# Patient Record
Sex: MALE | Race: OTHER
[De-identification: names, ages, dates, MRNs, and addresses within clinical notes are randomized per-mention and may not be internally consistent; named-entity substitution may affect disease eponyms.]

---

## 2019-12-05 ENCOUNTER — HOSPITAL ENCOUNTER (EMERGENCY)
Dept: HOSPITAL 72 - EMR | Age: 39
Discharge: HOME | End: 2019-12-05
Payer: MEDICAID

## 2019-12-05 VITALS — HEIGHT: 61 IN | BODY MASS INDEX: 37.76 KG/M2 | WEIGHT: 200 LBS

## 2019-12-05 VITALS — SYSTOLIC BLOOD PRESSURE: 156 MMHG | DIASTOLIC BLOOD PRESSURE: 98 MMHG

## 2019-12-05 VITALS — SYSTOLIC BLOOD PRESSURE: 168 MMHG | DIASTOLIC BLOOD PRESSURE: 68 MMHG

## 2019-12-05 DIAGNOSIS — B02.9: Primary | ICD-10-CM

## 2019-12-05 DIAGNOSIS — I10: ICD-10-CM

## 2019-12-05 DIAGNOSIS — Z88.0: ICD-10-CM

## 2019-12-05 PROCEDURE — 99282 EMERGENCY DEPT VISIT SF MDM: CPT

## 2019-12-05 NOTE — NUR
ED Nurse Note:





pt walked in c/o rash throughout his body with pain 9/10.

noted small clustered blistering rash and redness throughout pt's upper trunk 
area. tenderness noted. will cont monitor.

## 2019-12-05 NOTE — NUR
ED Nurse Note:





pt cleared to be d/c per ermd, pt discharge and aftercare instruction provided 
w/ prescription, pt education done via discussion and handout, pt advised to 
follow up with pcp, pt verbalized understanding, pt vss, ambulatory w/ steady 
gait, left w/ all belongings.

## 2019-12-05 NOTE — EMERGENCY ROOM REPORT
History of Present Illness


General


Chief Complaint:  Skin Rash/Abscess


Source:  Patient





Present Illness


HPI


Patientis a 39-year-old male presents after increased painful skin rash to the 

left side of his chest.  Patient had gradual onset of symptoms.  He reports 

taking medications for pain only.  He had previous history of psoriasis.  He is 

using only topical medications for this.  He reports having increased pain to 

the area to the left side of his chest.  He denies any fever.


Allergies:  


Uncoded Allergies:  


     PENICILLIN (Allergy, Unknown, 12/5/19)





Patient History


Past Medical History:  see triage record


Reviewed Nursing Documentation:  PMH: Agreed; PSxH: Agreed





Nursing Documentation-PMH


Hx Hypertension:  Yes





Review of Systems


All Other Systems:  negative except mentioned in HPI





Physical Exam





Vital Signs








  Date Time  Temp Pulse Resp B/P (MAP) Pulse Ox O2 Delivery O2 Flow Rate FiO2


 


12/5/19 00:40 98.4 85 18 183/111 (135) 97 Room Air  








General Appearance:  well appearing, no apparent distress, alert, GCS 15, obese


Head:  normocephalic, atraumatic


ENT:  hearing grossly normal, normal voice


Neck:  full range of motion, supple


Respiratory:  lungs clear, no respiratory distress, speaking full sentences


Gastrointestinal:  normal inspection


Musculoskeletal:  no calf tenderness


Neurologic:  alert, motor strength/tone normal, CNs III-XII nml as tested, 

normal gait


Psychiatric:  mood/affect normal


Skin:  rash - rash to left side of trunk





Medical Decision Making


Diagnostic Impression:  


 Primary Impression:  


 Shingles


ER Course


Present for skin rash.  Differential diagnosis includes but is not limited to 

allergic reaction, contact dermatitis, shingles among others.  Patient's rash 

is consistent with a shingles outbreak.  Patient will be given prescription for 

acyclovir.  He will also be given prescription for pain medications.  He was 

advised to follow-up with his primary care physician or dermatologist for 

recheck in the next few days.  He is advised to return if worse.





Last Vital Signs








  Date Time  Temp Pulse Resp B/P (MAP) Pulse Ox O2 Delivery O2 Flow Rate FiO2


 


12/5/19 00:40 98.4 85 18 183/111 (135) 97 Room Air  








Status:  improved


Disposition:  HOME, SELF-CARE


Condition:  Stable


Scripts


Hydrocodone Bit/Acetaminophen 5-325* (NORCO 5-325*) 1 Each Tablet


1 TAB ORAL Q6H PRN for For Pain, #20 TAB 0 Refills


   Prov: Pratik Robins MD         12/5/19 


Acyclovir* (ZOVIRAX*) 800 Mg Tablet


800 MG ORAL FIVE TIMES A DAY, #35 TAB


   Prov: Pratik Robins MD         12/5/19











Pratik Robins MD Dec 5, 2019 01:22

## 2020-02-26 ENCOUNTER — HOSPITAL ENCOUNTER (INPATIENT)
Dept: HOSPITAL 72 - EMR | Age: 40
LOS: 1 days | Discharge: HOME | DRG: 872 | End: 2020-02-27
Payer: SELF-PAY

## 2020-02-26 VITALS — SYSTOLIC BLOOD PRESSURE: 137 MMHG | DIASTOLIC BLOOD PRESSURE: 76 MMHG

## 2020-02-26 VITALS — DIASTOLIC BLOOD PRESSURE: 97 MMHG | SYSTOLIC BLOOD PRESSURE: 154 MMHG

## 2020-02-26 VITALS — SYSTOLIC BLOOD PRESSURE: 157 MMHG | DIASTOLIC BLOOD PRESSURE: 91 MMHG

## 2020-02-26 VITALS — SYSTOLIC BLOOD PRESSURE: 135 MMHG | DIASTOLIC BLOOD PRESSURE: 83 MMHG

## 2020-02-26 VITALS — DIASTOLIC BLOOD PRESSURE: 93 MMHG | SYSTOLIC BLOOD PRESSURE: 142 MMHG

## 2020-02-26 VITALS — DIASTOLIC BLOOD PRESSURE: 96 MMHG | SYSTOLIC BLOOD PRESSURE: 153 MMHG

## 2020-02-26 VITALS — WEIGHT: 233.12 LBS | BODY MASS INDEX: 39.8 KG/M2 | HEIGHT: 64 IN

## 2020-02-26 DIAGNOSIS — E66.01: ICD-10-CM

## 2020-02-26 DIAGNOSIS — Z88.0: ICD-10-CM

## 2020-02-26 DIAGNOSIS — E86.0: ICD-10-CM

## 2020-02-26 DIAGNOSIS — L40.9: ICD-10-CM

## 2020-02-26 DIAGNOSIS — N17.9: ICD-10-CM

## 2020-02-26 DIAGNOSIS — I10: ICD-10-CM

## 2020-02-26 DIAGNOSIS — A41.9: Primary | ICD-10-CM

## 2020-02-26 DIAGNOSIS — J20.9: ICD-10-CM

## 2020-02-26 LAB
ADD MANUAL DIFF: NO
ALBUMIN SERPL-MCNC: 3.4 G/DL (ref 3.4–5)
ALBUMIN/GLOB SERPL: 1 {RATIO} (ref 1–2.7)
ALP SERPL-CCNC: 115 U/L (ref 46–116)
ALT SERPL-CCNC: 32 U/L (ref 12–78)
ANION GAP SERPL CALC-SCNC: 11 MMOL/L (ref 5–15)
APPEARANCE UR: CLEAR
APTT PPP: (no result) S
AST SERPL-CCNC: 18 U/L (ref 15–37)
BILIRUB SERPL-MCNC: 0.3 MG/DL (ref 0.2–1)
BUN SERPL-MCNC: 17 MG/DL (ref 7–18)
CALCIUM SERPL-MCNC: 9 MG/DL (ref 8.5–10.1)
CHLORIDE SERPL-SCNC: 107 MMOL/L (ref 98–107)
CO2 SERPL-SCNC: 26 MMOL/L (ref 21–32)
CREAT SERPL-MCNC: 1.6 MG/DL (ref 0.55–1.3)
ERYTHROCYTE [DISTWIDTH] IN BLOOD BY AUTOMATED COUNT: 12.1 % (ref 11.6–14.8)
GLOBULIN SER-MCNC: 3.5 G/DL
GLUCOSE UR STRIP-MCNC: NEGATIVE MG/DL
HCT VFR BLD CALC: 48.5 % (ref 42–52)
HGB BLD-MCNC: 16.5 G/DL (ref 14.2–18)
KETONES UR QL STRIP: NEGATIVE
LEUKOCYTE ESTERASE UR QL STRIP: NEGATIVE
MCV RBC AUTO: 90 FL (ref 80–99)
NITRITE UR QL STRIP: NEGATIVE
PH UR STRIP: 6 [PH] (ref 4.5–8)
PLATELET # BLD: 193 K/UL (ref 150–450)
POTASSIUM SERPL-SCNC: 3.6 MMOL/L (ref 3.5–5.1)
PROT UR QL STRIP: (no result)
RBC # BLD AUTO: 5.4 M/UL (ref 4.7–6.1)
SODIUM SERPL-SCNC: 144 MMOL/L (ref 136–145)
SP GR UR STRIP: 1 (ref 1–1.03)
UROBILINOGEN UR-MCNC: NORMAL MG/DL (ref 0–1)
WBC # BLD AUTO: 14.1 K/UL (ref 4.8–10.8)

## 2020-02-26 PROCEDURE — 81003 URINALYSIS AUTO W/O SCOPE: CPT

## 2020-02-26 PROCEDURE — 96365 THER/PROPH/DIAG IV INF INIT: CPT

## 2020-02-26 PROCEDURE — 83605 ASSAY OF LACTIC ACID: CPT

## 2020-02-26 PROCEDURE — 82977 ASSAY OF GGT: CPT

## 2020-02-26 PROCEDURE — 36415 COLL VENOUS BLD VENIPUNCTURE: CPT

## 2020-02-26 PROCEDURE — 71045 X-RAY EXAM CHEST 1 VIEW: CPT

## 2020-02-26 PROCEDURE — 86710 INFLUENZA VIRUS ANTIBODY: CPT

## 2020-02-26 PROCEDURE — 96361 HYDRATE IV INFUSION ADD-ON: CPT

## 2020-02-26 PROCEDURE — 99285 EMERGENCY DEPT VISIT HI MDM: CPT

## 2020-02-26 PROCEDURE — 84550 ASSAY OF BLOOD/URIC ACID: CPT

## 2020-02-26 PROCEDURE — 84484 ASSAY OF TROPONIN QUANT: CPT

## 2020-02-26 PROCEDURE — 85025 COMPLETE CBC W/AUTO DIFF WBC: CPT

## 2020-02-26 PROCEDURE — 84100 ASSAY OF PHOSPHORUS: CPT

## 2020-02-26 PROCEDURE — 93005 ELECTROCARDIOGRAM TRACING: CPT

## 2020-02-26 PROCEDURE — 96368 THER/DIAG CONCURRENT INF: CPT

## 2020-02-26 PROCEDURE — 86703 HIV-1/HIV-2 1 RESULT ANTBDY: CPT

## 2020-02-26 PROCEDURE — 83036 HEMOGLOBIN GLYCOSYLATED A1C: CPT

## 2020-02-26 PROCEDURE — 80053 COMPREHEN METABOLIC PANEL: CPT

## 2020-02-26 PROCEDURE — 83735 ASSAY OF MAGNESIUM: CPT

## 2020-02-26 PROCEDURE — 87040 BLOOD CULTURE FOR BACTERIA: CPT

## 2020-02-26 RX ADMIN — DEXTROSE, SODIUM CHLORIDE, AND POTASSIUM CHLORIDE SCH MLS/HR: 5; .45; .15 INJECTION INTRAVENOUS at 13:45

## 2020-02-26 RX ADMIN — Medication SCH MG: at 14:40

## 2020-02-26 NOTE — CONSULTATION
Consult Note


Consult Note





Asked to eval at the request of Dr Sepulveda





Patient is a 39-year-old male presents after increased generalized body aches 

and nonproductive cough.  Gradual onset of symptoms.  Reports having 

generalized malaise as well as feeling weak all over.  Been having frequent 

episodes of coughing.  Prior history of hypertension.  He denies any other 

prior medical history.  Denies any recent travel.  He denies any vomiting or 

diarrhea.  States he does not drink alcohol regularly.


Allergies:  





     PENICILLIN (Allergy, Unknown, 12/5/19)








Hx Hypertension:  Yes


Assessment/Plan





Cr 1.6-   ? SERGIO


Proteinuria


HTN


Acute febrile illness , Tachycardia


Pneumonia








Hydrate


Per ID


check renal parameters











Mane Ortiz MD Feb 26, 2020 12:41

## 2020-02-26 NOTE — NUR
NURSE NOTES:

RECVD PT. PT IS AOX4, PT IN ON ROOM AIR WITH NO SIGN OF SOB OR RESP DISTRESS. PT HAS LEFT AC 
20 G SL.PT C/O MILD HEADACHE. WILL BE GIVING PT TYLENOL. BED IN LOWEST POSITION, CALL LIGHT 
WITHIN REACH, WILL CONTINUE WITH PLAN OF CARE

## 2020-02-26 NOTE — NUR
NURSE NOTES:

Pt received from Chemo muñoz, Pt is alert and oriented x4, on room air, In no acute distress. VSS 
temp 98.2. Heart rate is tachycardic at 111. Placed on heart rate monitor. Belongings 
checked and signed at bedside, declined to place valuables in safe. In bed bed is locked, 
lowest position, side rails x2, call light in reach.

## 2020-02-26 NOTE — NUR
ED Nurse Note:

Patient walked into ED c/o feeling hot, onset of symptoms of about 4 hours now. 
at time of arrival patient's temp is 101.5 with a heart rate of 134, reports of 
5/10 lower abdominal pain however no nausea or vomiting. patient is alert and 
oriented x4, and ambulatory with a steady gait. IV started on left forearm 18 
gauge, placed on a cardiac monitor. will continue to monitor

## 2020-02-26 NOTE — CONSULTATION
DATE OF CONSULTATION:  02/26/2020

INFECTIOUS DISEASES CONSULTATION



CONSULTING PHYSICIAN:  Luis Angel Melendez M.D.



PRIMARY ATTENDING PHYSICIAN:  Belle Bull M.D.



REASON FOR CONSULTATION:  Sepsis.



HISTORY OF PRESENT ILLNESS:  This is a 39-year-old  male admitted

yesterday complaining of fever, nonproductive cough, generalized pain, and

flu-like symptoms.  Symptoms started yesterday.  The patient had

temperature of 101.5 in the ER.  He had leukocytosis of 14,000.



PAST MEDICAL HISTORY:  Significant for hypertension, psoriasis.



PAST SURGICAL HISTORY:  He has history of umbilical hernia surgery.



ALLERGIES:  Allergic to penicillin, but tolerated Zosyn in the ER.



MEDICATIONS:  Getting Cardizem, hydralazine, and Tylenol.  Got a dose of

vancomycin and Zosyn.



SOCIAL HISTORY:  He is an ex-smoker.  He is single.  Denies alcohol or drug

abuse.



REVIEW OF SYSTEMS:  Afebrile since morning, some muscle pain in the back

with movement, and slight nonproductive cough.  He had urinary frequency

yesterday, but no problem today.



PHYSICAL EXAMINATION:

VITAL SIGNS:  Temperature 98.1, pulse 84, and blood pressure

154/97.

GENERAL APPEARANCE:  He seems to be obese.  BMI is 40.  No acute

distress.

HEAD AND NECK:  Pink conjunctivae.

HEART:  Normal rate.

LUNGS:  Clear.

ABDOMEN:  Soft and nontender.

EXTREMITIES:  He has no edema.

SKIN:  He has bilateral elbow and knee skin lesions.



LABORATORY AND DIAGNOSTIC DATA:  WBC 14.1, hemoglobin 16.4, hematocrit

48.5, and platelet is 193,000.  Sodium 144, potassium 3.6, chloride 107,

bicarb 26, BUN 17, creatinine 1.6.  LFT within normal limit.  UA was

negative for wbc; he has 2+ proteinuria.  Influenza A and B tests were

negative.



IMPRESSION:  Sepsis with fever and leukocytosis.  The source of infection

is not clear.  Chest x-ray and urine are negative.  He has psoriasis,

morbid obesity, and hypertension.



RECOMMENDATION:  The patient is started on ceftriaxone.  We will follow up

the blood culture.  If the patient remains afebrile and culture negative,

we will try to discharge the patient soon.  We will ask for HIV test.



At the end of my exam, I thank Dr. Bull for involving me in the care

of this patient.









  ______________________________________________

  Luis Angel Melendez M.D.





DR:  ELIZA

D:  02/26/2020 13:45

T:  02/26/2020 20:22

JOB#:  [id number]

CC:



PABLO

## 2020-02-26 NOTE — CONSULTATION
DATE OF CONSULTATION:  02/26/2020

PULMONARY CONSULTATION



CONSULTING PHYSICIAN:  Samuel Colin M.D.



REFERRING PHYSICIAN:  Belle Bull M.D.



HISTORY OF PRESENT ILLNESS:  This is a 39-year-old male who came to the

hospital with cough and generalized weakness.  He felt he was having

malaise.  He had coughing.  There was no history of recent travel.  In the

emergency room, he was evaluated.  He is found to have leukocytosis as

well as mitral insufficiency.  He underwent a workup for infectious

causes.  Influenza A and B were negative.  An x-ray of chest was obtained,

which was negative.  The patient admitted to the hospital with diagnosis

of presumptive pneumonia/bronchitis.



PAST MEDICAL HISTORY:  Notable for penicillin allergy and hypertension.



PREVIOUS SURGERIES:  None reported.



ALLERGIES:  Penicillin.



REVIEW OF SYSTEMS:  Denies any headaches, hematemesis, melena, or

hematochezia.



PHYSICAL EXAMINATION:

GENERAL:  Reveals a 39-year-old male.

VITAL SIGNS:  He is afebrile.  Blood pressure 150/90, heart rate _____,

respirations 18.

HEENT:  Unremarkable.

LUNGS:  Clear breath sounds bilaterally.

HEART:  Normal heart sounds.

ABDOMEN:  Soft.

EXTREMITIES:  There is no edema.



LABORATORY DATA:  As discussed above.  X-ray of chest is negative.



IMPRESSION:

1. Acute bronchitis.

2. Doubt pneumonia.



DISCUSSION:  Agree with the use of broad-spectrum antibiotics, this likely

represents a upper respiratory infection.  He has received Rocephin,

vancomycin, and Zosyn.  I will continue Rocephin and azithromycin.  We

will follow as pulmonologist.









  ______________________________________________

  Samuel Colin M.D.





DR:  JOSY

D:  02/26/2020 18:07

T:  02/26/2020 18:26

JOB#:  [id number]

CC:

## 2020-02-26 NOTE — NUR
TRANSFER TO FLOOR:

Patient transferred to [facility] via gurney accompanied by 2 rn in stable condition 
as ordered, per dr Bull.   Report given to Janeth RICKS.  Belongings sent with 
patient

## 2020-02-26 NOTE — HISTORY AND PHYSICAL REPORT
DATE OF ADMISSION:  02/26/2020

HISTORY OF PRESENT ILLNESS:  The patient comes in because of fever and

chills ________.  The patient had fever of 102, body ache and cough for

one day.  Influenza was negative.  Tachycardic as well.  Chest x-ray

showed right-sided infiltrate.  Antibiotics were given in the ER.  The

patient does have chills, mild cough, and headache for the past day or

so.



PAST MEDICAL HISTORY:  None.



PAST SURGICAL HISTORY:  None.



FAMILY HISTORY:  Noncontributory.



SOCIAL HISTORY:  Denies history of drug abuse.  Denies history of smoking.

Does have history of alcohol abuse.



MEDICATIONS:  None.



REVIEW OF SYSTEMS:  HEENT:  Denies headaches.  RESPIRATORY:  Denies

shortness of breath.  Does have mild cough.  CARDIOVASCULAR:  Denies chest

pain or orthopnea.  GASTROINTESTINAL:  Denies nausea, vomiting, or

diarrhea.  EXTREMITIES:  Luisito pain in lower extremities.  CENTRAL NERVOUS

SYSTEM:  Denies change in speech pattern.  Does have headache.



PHYSICAL EXAMINATION:

VITAL SIGNS:  Temperature is 96.6, pulse is 80, blood pressure is 142/93.

HEENT:  PERRLA.

NECK:  Supple.  No lymphadenopathy.

CHEST:  Clear to auscultation.

CARDIOVASCULAR:  Regular rate and rhythm.  No murmurs or extra sounds.

GASTROINTESTINAL:  Soft, nontender, nondistended.  No organomegaly.

EXTREMITIES:  No edema.  Moves all four extremities.  Sensory intact to

light touch.  Reflexes equal on both sides.



LABORATORY DATA:  WBC of 14.1, hemoglobin of 16.5, platelets 193.  Sodium

144, potassium 3.6, chloride 104, BUN of 17, creatinine 1.6.



ASSESSMENT AND PLAN:  Pneumonia.  Fever and chills due to pneumonia in this

case.  I have asked Dr. Samuel Colin and Dr. Luis Angel Melendez to see the

patient to help with the management of pneumonia.









  ______________________________________________

  Belle Bull M.D.





DR:  MAXIMINO

D:  02/26/2020 22:11

T:  02/26/2020 22:34

JOB#:  [id number]

CC:

## 2020-02-27 VITALS — DIASTOLIC BLOOD PRESSURE: 81 MMHG | SYSTOLIC BLOOD PRESSURE: 144 MMHG

## 2020-02-27 VITALS — SYSTOLIC BLOOD PRESSURE: 134 MMHG | DIASTOLIC BLOOD PRESSURE: 88 MMHG

## 2020-02-27 VITALS — DIASTOLIC BLOOD PRESSURE: 84 MMHG | SYSTOLIC BLOOD PRESSURE: 148 MMHG

## 2020-02-27 VITALS — DIASTOLIC BLOOD PRESSURE: 96 MMHG | SYSTOLIC BLOOD PRESSURE: 155 MMHG

## 2020-02-27 LAB
ADD MANUAL DIFF: NO
ALBUMIN SERPL-MCNC: 3 G/DL (ref 3.4–5)
ALBUMIN/GLOB SERPL: 0.8 {RATIO} (ref 1–2.7)
ALP SERPL-CCNC: 101 U/L (ref 46–116)
ALT SERPL-CCNC: 36 U/L (ref 12–78)
ANION GAP SERPL CALC-SCNC: 10 MMOL/L (ref 5–15)
AST SERPL-CCNC: 25 U/L (ref 15–37)
BASOPHILS NFR BLD AUTO: 0.4 % (ref 0–2)
BILIRUB SERPL-MCNC: 0.5 MG/DL (ref 0.2–1)
BUN SERPL-MCNC: 13 MG/DL (ref 7–18)
CALCIUM SERPL-MCNC: 9 MG/DL (ref 8.5–10.1)
CHLORIDE SERPL-SCNC: 108 MMOL/L (ref 98–107)
CO2 SERPL-SCNC: 25 MMOL/L (ref 21–32)
CREAT SERPL-MCNC: 1.3 MG/DL (ref 0.55–1.3)
EOSINOPHIL NFR BLD AUTO: 3.7 % (ref 0–3)
ERYTHROCYTE [DISTWIDTH] IN BLOOD BY AUTOMATED COUNT: 12.1 % (ref 11.6–14.8)
GAMMA GLUTAMYL TRANSPEPTIDASE: 62 U/L (ref 5–85)
GLOBULIN SER-MCNC: 3.6 G/DL
HCT VFR BLD CALC: 48.9 % (ref 42–52)
HGB BLD-MCNC: 16.7 G/DL (ref 14.2–18)
LYMPHOCYTES NFR BLD AUTO: 13.8 % (ref 20–45)
MCV RBC AUTO: 90 FL (ref 80–99)
MONOCYTES NFR BLD AUTO: 11.1 % (ref 1–10)
NEUTROPHILS NFR BLD AUTO: 70.9 % (ref 45–75)
PHOSPHATE SERPL-MCNC: 3.2 MG/DL (ref 2.5–4.9)
PLATELET # BLD: 158 K/UL (ref 150–450)
POTASSIUM SERPL-SCNC: 3.8 MMOL/L (ref 3.5–5.1)
RBC # BLD AUTO: 5.42 M/UL (ref 4.7–6.1)
SODIUM SERPL-SCNC: 142 MMOL/L (ref 136–145)
WBC # BLD AUTO: 8 K/UL (ref 4.8–10.8)

## 2020-02-27 RX ADMIN — DEXTROSE, SODIUM CHLORIDE, AND POTASSIUM CHLORIDE SCH MLS/HR: 5; .45; .15 INJECTION INTRAVENOUS at 02:50

## 2020-02-27 RX ADMIN — DEXTROSE, SODIUM CHLORIDE, AND POTASSIUM CHLORIDE SCH MLS/HR: 5; .45; .15 INJECTION INTRAVENOUS at 06:24

## 2020-02-27 RX ADMIN — Medication SCH MG: at 09:19

## 2020-02-27 NOTE — INFECTIOUS DISEASES PROG NOTE
Assessment/Plan


Assessment/Plan


IMPRESSION:  


Sepsis, resolved fever and leukocytosis.  


Acute febrile illness


psoriasis,


Morbid obesity


Hypertension.





RECOMMENDATION:  


Continue ceftriaxone.  


We will follow up cultures.





Subjective


ROS Limited/Unobtainable:  No


Respiratory:  Reports: no symptoms


Cardiovascular:  Reports: no symptoms


Gastrointestinal/Abdominal:  Reports: no symptoms


Genitourinary:  Reports: no symptoms


Allergies:  


Coded Allergies:  


     PENICILLINS (Unverified  Allergy, Unknown, 2/26/20)





Objective


Vital Signs





Last 24 Hour Vital Signs








  Date Time  Temp Pulse Resp B/P (MAP) Pulse Ox O2 Delivery O2 Flow Rate FiO2


 


2/27/20 09:19  89  154/110    


 


2/27/20 08:14      Room Air  


 


2/27/20 08:00 97.9 63 20 134/88 (103) 98   


 


2/27/20 08:00  84      


 


2/27/20 04:00  88      


 


2/27/20 04:00 97.9 73 16 144/81 (102) 98   


 


2/27/20 00:00 99.3 94 16 148/84 (105) 98   


 


2/27/20 00:00  94      


 


2/26/20 21:00      Room Air  


 


2/26/20 20:00 99.0 84 16 137/76 (96) 99   


 


2/26/20 20:00  99      


 


2/26/20 16:45 99.0       


 


2/26/20 16:00  91      


 


2/26/20 16:00 97.9 89 21 153/96 (115) 98   


 


2/26/20 14:40  93  154/97    


 


2/26/20 12:00  93      


 


2/26/20 12:00 98.1 84 19 154/97 (116) 98   








Height (Feet):  5


Height (Inches):  4.00


Weight (Pounds):  233


General Appearance:  no acute distress, other - obese


HEENT:  mucous membranes moist


Respiratory/Chest:  lungs clear


Cardiovascular:  normal rate


Abdomen:  soft, non tender


Extremities:  no edema


Skin:  lesions, other - elbows & knees


Neurologic/Psychiatric:  alert, oriented x 3, responsive





Microbiology








 Date/Time


Source Procedure


Growth Status


 


 


 2/26/20 01:10


Blood Blood Culture - Preliminary


NO GROWTH AFTER 24 HOURS Resulted


 


 2/26/20 01:00


Blood Blood Culture - Preliminary


NO GROWTH AFTER 24 HOURS Resulted





 2/26/20 02:05


Nasal Nares - Final Complete


 


 2/26/20 02:05


Nasal Nares - Final Complete











Laboratory Tests








Test


  2/27/20


05:20


 


White Blood Count


  8.0 K/UL


(4.8-10.8)


 


Red Blood Count


  5.42 M/UL


(4.70-6.10)


 


Hemoglobin


  16.7 G/DL


(14.2-18.0)


 


Hematocrit


  48.9 %


(42.0-52.0)


 


Mean Corpuscular Volume 90 FL (80-99)  


 


Mean Corpuscular Hemoglobin


  30.8 PG


(27.0-31.0)


 


Mean Corpuscular Hemoglobin


Concent 34.2 G/DL


(32.0-36.0)


 


Red Cell Distribution Width


  12.1 %


(11.6-14.8)


 


Platelet Count


  158 K/UL


(150-450)


 


Mean Platelet Volume


  8.3 FL


(6.5-10.1)


 


Neutrophils (%) (Auto)


  70.9 %


(45.0-75.0)


 


Lymphocytes (%) (Auto)


  13.8 %


(20.0-45.0)  L


 


Monocytes (%) (Auto)


  11.1 %


(1.0-10.0)  H


 


Eosinophils (%) (Auto)


  3.7 %


(0.0-3.0)  H


 


Basophils (%) (Auto)


  0.4 %


(0.0-2.0)


 


Sodium Level


  142 MMOL/L


(136-145)


 


Potassium Level


  3.8 MMOL/L


(3.5-5.1)


 


Chloride Level


  108 MMOL/L


()  H


 


Carbon Dioxide Level


  25 MMOL/L


(21-32)


 


Anion Gap


  10 mmol/L


(5-15)


 


Blood Urea Nitrogen


  13 mg/dL


(7-18)


 


Creatinine


  1.3 MG/DL


(0.55-1.30)


 


Estimat Glomerular Filtration


Rate > 60 mL/min


(>60)


 


Glucose Level


  120 MG/DL


()  H


 


Hemoglobin A1c


  5.1 %


(4.3-6.0)


 


Uric Acid


  6.0 MG/DL


(2.6-7.2)


 


Calcium Level


  9.0 MG/DL


(8.5-10.1)


 


Phosphorus Level


  3.2 MG/DL


(2.5-4.9)


 


Magnesium Level


  2.0 MG/DL


(1.8-2.4)


 


Total Bilirubin


  0.5 MG/DL


(0.2-1.0)


 


Gamma Glutamyl Transpeptidase 62 U/L (5-85)  


 


Aspartate Amino Transf


(AST/SGOT) 25 U/L (15-37)


 


 


Alanine Aminotransferase


(ALT/SGPT) 36 U/L (12-78)


 


 


Alkaline Phosphatase


  101 U/L


()


 


Total Protein


  6.6 G/DL


(6.4-8.2)


 


Albumin


  3.0 G/DL


(3.4-5.0)  L


 


Globulin 3.6 g/dL  


 


Albumin/Globulin Ratio


  0.8 (1.0-2.7)


L


 


HIV (1&2) Antibody Rapid


  Negative


(NEGATIVE)











Current Medications








 Medications


  (Trade)  Dose


 Ordered  Sig/Mercedes


 Route


 PRN Reason  Start Time


 Stop Time Status Last Admin


Dose Admin


 


 Acetaminophen


  (Tylenol)  650 mg  Q4H  PRN


 ORAL


 Mild Pain/Temp > 100.5  2/26/20 08:00


 3/27/20 07:59  2/27/20 00:14


 


 


 Azithromycin 500


 mg/Dextrose  275 ml @ 


 275 mls/hr  Q24HRS


 IV


   2/26/20 20:00


 3/3/20 20:59  2/26/20 20:30


 


 


 Ceftriaxone


 Sodium 1 gm/


 Dextrose  55 ml @ 


 110 mls/hr  Q24H


 IVPB


   2/26/20 15:00


 3/4/20 14:59  2/26/20 16:00


 


 


 Dextrose/


 Electrolytes  1,000 ml @ 


 75 mls/hr  [id number]


 IV


   2/26/20 13:30


 3/27/20 13:29  2/27/20 06:24


 


 


 Diltiazem HCl


  (Cardizem CD)  120 mg  DAILY


 ORAL


   2/26/20 14:30


 3/27/20 14:29  2/27/20 09:19


 


 


 Hydralazine HCl


  (Apresoline)  25 mg  Q4H  PRN


 ORAL


 bp over 160 syst  2/26/20 12:45


 3/27/20 12:44   


 

















Luis Angel Melendez MD Feb 27, 2020 10:44

## 2020-02-27 NOTE — GENERAL PROGRESS NOTE
Assessment/Plan


Problem List:  


(1) Pneumonia


ICD Codes:  J18.9 - Pneumonia, unspecified organism


SNOMED:  810746417


(2) Tachycardia


ICD Codes:  R00.0 - Tachycardia, unspecified


SNOMED:  7089140


(3) Acute febrile illness


ICD Codes:  R50.9 - Fever, unspecified


SNOMED:  940763484


Status:  progressing


Assessment/Plan:


afebrile


no sob


no cough


pna


improving





Subjective


ROS Limited/Unobtainable:  Yes


Allergies:  


Coded Allergies:  


     PENICILLINS (Unverified  Allergy, Unknown, 2/26/20)





Objective





Last 24 Hour Vital Signs








  Date Time  Temp Pulse Resp B/P (MAP) Pulse Ox O2 Delivery O2 Flow Rate FiO2


 


2/27/20 09:19  89  154/110    


 


2/27/20 08:14      Room Air  


 


2/27/20 08:00 97.9 63 20 134/88 (103) 98   


 


2/27/20 08:00  84      


 


2/27/20 04:00  88      


 


2/27/20 04:00 97.9 73 16 144/81 (102) 98   


 


2/27/20 00:00 99.3 94 16 148/84 (105) 98   


 


2/27/20 00:00  94      


 


2/26/20 21:00      Room Air  


 


2/26/20 20:00 99.0 84 16 137/76 (96) 99   


 


2/26/20 20:00  99      


 


2/26/20 16:45 99.0       


 


2/26/20 16:00  91      


 


2/26/20 16:00 97.9 89 21 153/96 (115) 98   


 


2/26/20 14:40  93  154/97    


 


2/26/20 12:00  93      


 


2/26/20 12:00 98.1 84 19 154/97 (116) 98   

















Intake and Output  


 


 2/26/20 2/27/20





 19:00 07:00


 


Intake Total  600 ml


 


Output Total  1250 ml


 


Balance  -650 ml


 


  


 


Other  600 ml


 


Output Urine Total  1250 ml








Laboratory Tests


2/27/20 05:20: 


White Blood Count 8.0, Red Blood Count 5.42, Hemoglobin 16.7, Hematocrit 48.9, 

Mean Corpuscular Volume 90, Mean Corpuscular Hemoglobin 30.8, Mean Corpuscular 

Hemoglobin Concent 34.2, Red Cell Distribution Width 12.1, Platelet Count 158, 

Mean Platelet Volume 8.3, Neutrophils (%) (Auto) 70.9, Lymphocytes (%) (Auto) 

13.8L, Monocytes (%) (Auto) 11.1H, Eosinophils (%) (Auto) 3.7H, Basophils (%) (

Auto) 0.4, Sodium Level 142, Potassium Level 3.8, Chloride Level 108H, Carbon 

Dioxide Level 25, Anion Gap 10, Blood Urea Nitrogen 13, Creatinine 1.3, Estimat 

Glomerular Filtration Rate > 60, Glucose Level 120H, Hemoglobin A1c 5.1, Uric 

Acid 6.0, Calcium Level 9.0, Phosphorus Level 3.2, Magnesium Level 2.0, Total 

Bilirubin 0.5, Gamma Glutamyl Transpeptidase 62, Aspartate Amino Transf (AST/

SGOT) 25, Alanine Aminotransferase (ALT/SGPT) 36, Alkaline Phosphatase 101, 

Total Protein 6.6, Albumin 3.0L, Globulin 3.6, Albumin/Globulin Ratio 0.8L, HIV 

(1&2) Antibody Rapid Negative


Height (Feet):  5


Height (Inches):  4.00


Weight (Pounds):  233


Neck:  supple


Cardiovascular:  normal peripheral pulses


Respiratory/Chest:  lungs clear











Belle Bull MD Feb 27, 2020 10:20

## 2020-02-27 NOTE — NUR
NURSE NOTES:

recvd pt. Pt is AOX4,  Pt is on room air with no sign of sob or resp distress. Pt has IV 
site on left AC with no sign of redness or infiltration. Bed in lowest locked position, call 
light within reach, will continue with plan of care

## 2020-02-27 NOTE — NEPHROLOGY PROGRESS NOTE
Assessment/Plan


Problem List:  


(1) Acute febrile illness


(2) Hypertension


(3) Dehydration


Assessment:  Cr normalized





(4) Obese


Assessment


Cr 1.6-   ? SERGIO


Proteinuria


HTN


Acute febrile illness , Tachycardia


Pneumonia


Obese


Plan


Hydrate


Per ID


check renal parameters





Subjective


ROS Limited/Unobtainable:  No


Constitutional:  Reports: malaise





Objective


Objective





Last 24 Hour Vital Signs








  Date Time  Temp Pulse Resp B/P (MAP) Pulse Ox O2 Delivery O2 Flow Rate FiO2


 


2/27/20 09:19  89  154/110    


 


2/27/20 08:14      Room Air  


 


2/27/20 08:00 97.9 63 20 134/88 (103) 98   


 


2/27/20 08:00  84      


 


2/27/20 04:00  88      


 


2/27/20 04:00 97.9 73 16 144/81 (102) 98   


 


2/27/20 00:00 99.3 94 16 148/84 (105) 98   


 


2/27/20 00:00  94      


 


2/26/20 21:00      Room Air  


 


2/26/20 20:00 99.0 84 16 137/76 (96) 99   


 


2/26/20 20:00  99      


 


2/26/20 16:45 99.0       


 


2/26/20 16:00  91      


 


2/26/20 16:00 97.9 89 21 153/96 (115) 98   


 


2/26/20 14:40  93  154/97    


 


2/26/20 12:00  93      


 


2/26/20 12:00 98.1 84 19 154/97 (116) 98   

















Intake and Output  


 


 2/26/20 2/27/20





 19:00 07:00


 


Intake Total  600 ml


 


Output Total  1250 ml


 


Balance  -650 ml


 


  


 


Other  600 ml


 


Output Urine Total  1250 ml








Laboratory Tests


2/27/20 05:20: 


White Blood Count 8.0, Red Blood Count 5.42, Hemoglobin 16.7, Hematocrit 48.9, 

Mean Corpuscular Volume 90, Mean Corpuscular Hemoglobin 30.8, Mean Corpuscular 

Hemoglobin Concent 34.2, Red Cell Distribution Width 12.1, Platelet Count 158, 

Mean Platelet Volume 8.3, Neutrophils (%) (Auto) 70.9, Lymphocytes (%) (Auto) 

13.8L, Monocytes (%) (Auto) 11.1H, Eosinophils (%) (Auto) 3.7H, Basophils (%) (

Auto) 0.4, Sodium Level 142, Potassium Level 3.8, Chloride Level 108H, Carbon 

Dioxide Level 25, Anion Gap 10, Blood Urea Nitrogen 13, Creatinine 1.3, Estimat 

Glomerular Filtration Rate > 60, Glucose Level 120H, Hemoglobin A1c 5.1, Uric 

Acid 6.0, Calcium Level 9.0, Phosphorus Level 3.2, Magnesium Level 2.0, Total 

Bilirubin 0.5, Gamma Glutamyl Transpeptidase 62, Aspartate Amino Transf (AST/

SGOT) 25, Alanine Aminotransferase (ALT/SGPT) 36, Alkaline Phosphatase 101, 

Total Protein 6.6, Albumin 3.0L, Globulin 3.6, Albumin/Globulin Ratio 0.8L, HIV 

(1&2) Antibody Rapid Negative


Height (Feet):  5


Height (Inches):  4.00


Weight (Pounds):  233


General Appearance:  no apparent distress


Objective


no change











Mane Ortiz MD Feb 27, 2020 11:30

## 2020-02-27 NOTE — PULMONOLOGY PROGRESS NOTE
Assessment/Plan


Assessment/Plan


IMPRESSION:


1. Acute bronchitis.


2. Doubt pneumonia.





DISCUSSION: 





Doing much better


OK to dc on PO abx














  ______________________________________________


  Samuel Colin M.D.





Subjective


Interval Events:  Better; afebrile; feeling well


Constitutional:  Reports: no symptoms


HEENT:  Repors: no symptoms


Respiratory:  Reports: no symptoms


Cardiovascular:  Reports: no symptoms


Gastrointestinal/Abdominal:  Reports: no symptoms


Allergies:  


Coded Allergies:  


     PENICILLINS (Unverified  Allergy, Unknown, 2/26/20)





Objective





Last 24 Hour Vital Signs








  Date Time  Temp Pulse Resp B/P (MAP) Pulse Ox O2 Delivery O2 Flow Rate FiO2


 


2/27/20 09:19  89  154/110    


 


2/27/20 08:14      Room Air  


 


2/27/20 08:00 97.9 63 20 134/88 (103) 98   


 


2/27/20 08:00  84      


 


2/27/20 04:00  88      


 


2/27/20 04:00 97.9 73 16 144/81 (102) 98   


 


2/27/20 00:00 99.3 94 16 148/84 (105) 98   


 


2/27/20 00:00  94      


 


2/26/20 21:00      Room Air  


 


2/26/20 20:00 99.0 84 16 137/76 (96) 99   


 


2/26/20 20:00  99      


 


2/26/20 16:45 99.0       


 


2/26/20 16:00  91      


 


2/26/20 16:00 97.9 89 21 153/96 (115) 98   


 


2/26/20 14:40  93  154/97    


 


2/26/20 12:00  93      


 


2/26/20 12:00 98.1 84 19 154/97 (116) 98   

















Intake and Output  


 


 2/26/20 2/27/20





 19:00 07:00


 


Intake Total  600 ml


 


Output Total  1250 ml


 


Balance  -650 ml


 


  


 


Other  600 ml


 


Output Urine Total  1250 ml








General Appearance:  no acute distress


HEENT:  normocephalic


Respiratory/Chest:  chest wall non-tender


Cardiovascular:  normal peripheral pulses


Abdomen:  normal bowel sounds





Microbiology








 Date/Time


Source Procedure


Growth Status


 


 


 2/26/20 01:10


Blood Blood Culture - Preliminary


NO GROWTH AFTER 24 HOURS Resulted


 


 2/26/20 01:00


Blood Blood Culture - Preliminary


NO GROWTH AFTER 24 HOURS Resulted





 2/26/20 02:05


Nasal Nares - Final Complete


 


 2/26/20 02:05


Nasal Nares - Final Complete








Laboratory Tests


2/27/20 05:20: 


White Blood Count 8.0, Red Blood Count 5.42, Hemoglobin 16.7, Hematocrit 48.9, 

Mean Corpuscular Volume 90, Mean Corpuscular Hemoglobin 30.8, Mean Corpuscular 

Hemoglobin Concent 34.2, Red Cell Distribution Width 12.1, Platelet Count 158, 

Mean Platelet Volume 8.3, Neutrophils (%) (Auto) 70.9, Lymphocytes (%) (Auto) 

13.8L, Monocytes (%) (Auto) 11.1H, Eosinophils (%) (Auto) 3.7H, Basophils (%) (

Auto) 0.4, Sodium Level 142, Potassium Level 3.8, Chloride Level 108H, Carbon 

Dioxide Level 25, Anion Gap 10, Blood Urea Nitrogen 13, Creatinine 1.3, Estimat 

Glomerular Filtration Rate > 60, Glucose Level 120H, Hemoglobin A1c 5.1, Uric 

Acid 6.0, Calcium Level 9.0, Phosphorus Level 3.2, Magnesium Level 2.0, Total 

Bilirubin 0.5, Gamma Glutamyl Transpeptidase 62, Aspartate Amino Transf (AST/

SGOT) 25, Alanine Aminotransferase (ALT/SGPT) 36, Alkaline Phosphatase 101, 

Total Protein 6.6, Albumin 3.0L, Globulin 3.6, Albumin/Globulin Ratio 0.8L, HIV 

(1&2) Antibody Rapid Negative





Current Medications








 Medications


  (Trade)  Dose


 Ordered  Sig/Mercedes


 Route


 PRN Reason  Start Time


 Stop Time Status Last Admin


Dose Admin


 


 Acetaminophen


  (Tylenol)  650 mg  Q4H  PRN


 ORAL


 Mild Pain/Temp > 100.5  2/26/20 08:00


 3/27/20 07:59  2/27/20 00:14


 


 


 Azithromycin 500


 mg/Dextrose  275 ml @ 


 275 mls/hr  Q24HRS


 IV


   2/26/20 20:00


 3/3/20 20:59  2/26/20 20:30


 


 


 Ceftriaxone


 Sodium 1 gm/


 Dextrose  55 ml @ 


 110 mls/hr  Q24H


 IVPB


   2/26/20 15:00


 3/4/20 14:59  2/26/20 16:00


 


 


 Dextrose/


 Electrolytes  1,000 ml @ 


 75 mls/hr  [id number]


 IV


   2/26/20 13:30


 3/27/20 13:29  2/27/20 06:24


 


 


 Diltiazem HCl


  (Cardizem CD)  120 mg  DAILY


 ORAL


   2/26/20 14:30


 3/27/20 14:29  2/27/20 09:19


 


 


 Hydralazine HCl


  (Apresoline)  25 mg  Q4H  PRN


 ORAL


 bp over 160 syst  2/26/20 12:45


 3/27/20 12:44   


 

















Samuel Colin MD Feb 27, 2020 10:34

## 2020-03-02 NOTE — DISCHARGE SUMMARY
Discharge Summary


Discharge Summary


_


DATE OF ADMISSION: 2/26/2020





DATE OF DISCHARGE: 2/27/2020 








DISCHARGED BY: Dr. Bull





REASON FOR ADMISSION: 


39 years old male with past medical history of hypertension,  presented with 

generalized body aches and nonproductive cough.  


He reported generalized malaise, weakness and cough .


Patient reported gradual onset of symptoms . 


Upon evaluation patient had low-grade fever  and was tachycardic Pulse oximetry 

was stable on room air.


He denied   recent traveling .


No vomiting or diarrhea


Laboratory work-up revealed leukocytosis WBC 14.1 , stable hemoglobin  , 

hematocrit and platelet count.  


Stable electrolytes.  


BUN 17, creatinine 1.6.  


Lactic acid 1.7.  


Stable LFT.  


EKG revealed sinus tachycardia , no acute ischemic changes.  


Chest x-ray revealed no acute cardiopulmonary pathology.  


Patient admitted with acute febrile illness and for further management


 


CONSULTANTS:


pulmonary Dr. Colin


ID specialist Dr. Hema Melendez


nephrologist Dr. Ortiz


 


 


HOSPITAL COURSE: 


Patient admitted to telemetry floor.  


Patient started on empiric antibiotic  as per ID specialist recommendations. 


Influenza swab was negative.  


Blood cultures were negative.  


 


Leukocytosis resolved the  next day.  


Fevers resolved .


 


Pulmonologist followed.  


Supplemental oxygen provided and titrated to keep oximetry above 92%.  


Bronchodilator treatment via HHN provided.  


Antitussive were  on board as needed.  


Pulmonologist doubted pneumonia, was more inclined  towards  acute bronchitis.


Patient symptomatically improved  





Nephrologist followed.  


Patient was hydrated.  


Renal parameters and  electrolytes were closely monitored.  Nephrotoxic avoided 

if possible .


The next day creatinine from 1.6 down to 1.3 acute kidney injury resolved.  


Of note , urinalysis revealed +2 protein.








Patient clinically stabilized and was ready for discharge.  


Due to rapid and unexpected improvement patient condition patient was 

discharged in 1 day.








FINAL DIAGNOSES: 


Sepsis with fever and leukocytosis -resolved


Acute febrile illness


Acute bronchitis


Acute kidney injury-resolved 


Proteinuria


Morbid obesity


Hypertension


Psoriasis





DISCHARGE MEDICATIONS:


See Medication Reconciliation list.





DISCHARGE INSTRUCTIONS:


Patient was discharged home.


Follow-up with primary care provider in 1 week.





I have been assigned to dictate discharge summary for this account. 


I was not involved in the patient's management.











Alia Shelby NP Mar 2, 2020 08:18

## 2020-06-14 ENCOUNTER — HOSPITAL ENCOUNTER (EMERGENCY)
Dept: HOSPITAL 72 - EMR | Age: 40
Discharge: HOME | End: 2020-06-14
Payer: SELF-PAY

## 2020-06-14 VITALS — BODY MASS INDEX: 37.56 KG/M2 | WEIGHT: 220 LBS | HEIGHT: 64 IN

## 2020-06-14 VITALS — DIASTOLIC BLOOD PRESSURE: 97 MMHG | SYSTOLIC BLOOD PRESSURE: 146 MMHG

## 2020-06-14 VITALS — DIASTOLIC BLOOD PRESSURE: 104 MMHG | SYSTOLIC BLOOD PRESSURE: 161 MMHG

## 2020-06-14 DIAGNOSIS — Z88.0: ICD-10-CM

## 2020-06-14 DIAGNOSIS — I10: ICD-10-CM

## 2020-06-14 DIAGNOSIS — M54.5: Primary | ICD-10-CM

## 2020-06-14 PROCEDURE — 72020 X-RAY EXAM OF SPINE 1 VIEW: CPT

## 2020-06-14 PROCEDURE — 99283 EMERGENCY DEPT VISIT LOW MDM: CPT

## 2020-06-14 NOTE — DIAGNOSTIC IMAGING REPORT
EXAM:

  XR Lumbosacral Spine, 2 or 3 Views

 

CLINICAL HISTORY:

  TRAUMA

 

TECHNIQUE:

  Frontal and lateral views of the lumbar spine and sacrum.

 

COMPARISON:

  No relevant prior studies available.

 

FINDINGS:

  Vertebrae:  There is mild anterior height loss at T12 which is probably 

chronic.  No acute fracture.  Normal alignment.

  Sacrum/coccyx:  Unremarkable as visualized.  No acute fracture.

  Disc spaces:  Degenerative changes in the lumbar spine.  No acute 

lumbar spine fracture.

  Soft tissues:  Unremarkable.

 

IMPRESSION:     

1.  No acute lumbar spine fracture.

2.  Mild anterior height loss of T12 is likely chronic.

## 2020-06-14 NOTE — NUR
Nurse Note:



Pt walked in c/o lower back pain for 3 days. Pt stated he slept on the floor 
and s/s started afterwards. 8/10 pain, took aleeve and tylenol, not effective. 
Pt able to move legs and feet with no discomfort. Pt stated pain occurs when he 
stands and bends.

## 2020-06-14 NOTE — EMERGENCY ROOM REPORT
History of Present Illness


General


Chief Complaint:  Back Pain-No Injury


Source:  Patient





Present Illness


HPI


This is a 39-year-old male with history of high blood pressure.  He presents 

with chief complaint of back pain.  Onset for last 3 days now.  He said he 

slept on the floor and the next day we got up he complained of back pain the 

left side.  Radiate to the hip area.  Worse with movement.  Some relief with 

Aleve and Motrin.  No incontinence of bowel or urine.  No trauma.  Pain is 8 

out of 10.  No fever or chills.


Allergies:  


Coded Allergies:  


     PENICILLINS (Unverified  Allergy, Unknown, 2/26/20)





COVID-19 Screening


Contact w/high risk pt:  No


Recent Travel to affected area:  No


Experienced COVID-19 symptoms?:  No


COVID-19 Testing performed PTA:  No





Patient History


Past Medical History:  see triage record, old chart reviewed, HTN


Past Surgical History:  none


Pertinent Family History:  none


Social History:  Denies: smoking


Immunizations:  other


Reviewed Nursing Documentation:  PMH: Agreed; PSxH: Agreed





Nursing Documentation-PMH


Hx Cardiac Problems:  Yes


Hx Hypertension:  Yes


Hx Cancer:  No


Hx Gastrointestinal Problems:  No


Hx Neurological Problems:  No





Review of Systems


Eye:  Denies: eye pain, blurred vision


ENT:  Denies: ear pain, nose congestion, throat swelling


Respiratory:  Denies: cough, shortness of breath


Cardiovascular:  Denies: chest pain, palpitations


Gastrointestinal:  Denies: abdominal pain, diarrhea, nausea, vomiting


Musculoskeletal:  Reports: back pain; Denies: joint pain


Skin:  Denies: rash


Neurological:  Denies: headache, numbness


Endocrine:  Denies: increased thirst, increased urine


Hematologic/Lymphatic:  Denies: easy bruising


All Other Systems:  negative except mentioned in HPI





Physical Exam





Vital Signs








  Date Time  Temp Pulse Resp B/P (MAP) Pulse Ox O2 Delivery O2 Flow Rate FiO2


 


6/14/20 02:05 98.1 75 18 161/104 (123) 98 Room Air  





Vitals with high blood pressure


Sp02 EP Interpretation:  reviewed, normal


General Appearance:  well appearing, no apparent distress, alert


Head:  normocephalic, atraumatic


Eyes:  bilateral eye PERRL, bilateral eye EOMI


ENT:  hearing grossly normal, normal pharynx


Neck:  full range of motion, supple, no meningismus


Respiratory:  chest non-tender, lungs clear, normal breath sounds


Cardiovascular #1:  regular rate, rhythm, no murmur


Gastrointestinal:  normal bowel sounds, non tender, no mass, no organomegaly, 

no bruit, non-distended


Musculoskeletal:  back normal - Tenderness to the lower lumbar on the left 

side.  No step-off.  No anesthesia., normal range of motion, gait/station normal


Psychiatric:  mood/affect normal





Medical Decision Making


Diagnostic Impression:  


 Primary Impression:  


 Back pain


 Qualified Codes:  M54.5 - Low back pain


 Additional Impression:  


 Hypertension


 Qualified Codes:  I10 - Essential (primary) hypertension


ER Course


Presents with back pain.  No evidence of any fracture dislocation.  No evidence 

of cauda equina syndrome, spinal epidural abscess or neoplastic process.


Other X-Ray Diagnostic Results


Other X-Ray Diagnostic Results :  


   X-Ray ordered:  X-rays lumbar spine


   # of Views/Limited Vs Complete:  Complete


   Indication:  Pain


   EP Interpretation:  Yes


   Interpretation:  no dislocation, no soft tissue swelling, no fractures


   Impression:  No acute disease


   Electronically Signed by:  Nando Nunez MD





Last Vital Signs








  Date Time  Temp Pulse Resp B/P (MAP) Pulse Ox O2 Delivery O2 Flow Rate FiO2


 


6/14/20 02:14 98.1 88 18 161/104 98 Room Air  








Status:  improved


Disposition:  HOME, SELF-CARE


Condition:  Stable


Scripts


Ibuprofen* (MOTRIN*) 600 Mg Tablet


600 MG ORAL Q6H PRN for For Pain, #30 TAB 0 Refills


   Prov: Nando Nunez MD         6/14/20 


Hydrocodone/Acetaminophen 5-325* (HYDROCODONE/ACETAMINOPHEN 5-325*) 1 Each 

Tablet


1 TAB ORAL Q6H PRN for For Pain, #15 TAB 0 Refills


   Prov: Nando Nunez MD         6/14/20


Referrals:  


NOT CHOSEN [facility]/MD,REFERRING (PCP)


Patient Instructions:  Back Pain, Adult











Nando Nunez MD Jun 14, 2020 02:23